# Patient Record
Sex: FEMALE | Race: OTHER | URBAN - METROPOLITAN AREA
[De-identification: names, ages, dates, MRNs, and addresses within clinical notes are randomized per-mention and may not be internally consistent; named-entity substitution may affect disease eponyms.]

---

## 2024-03-04 ENCOUNTER — APPOINTMENT (RX ONLY)
Dept: URBAN - METROPOLITAN AREA CLINIC 41 | Facility: CLINIC | Age: 74
Setting detail: DERMATOLOGY
End: 2024-03-04

## 2024-03-04 DIAGNOSIS — Z41.9 ENCOUNTER FOR PROCEDURE FOR PURPOSES OTHER THAN REMEDYING HEALTH STATE, UNSPECIFIED: ICD-10-CM

## 2024-03-04 PROCEDURE — ? FILLERS

## 2024-03-04 NOTE — PROCEDURE: FILLERS
Additional Area 4 Volume In Cc: 0
Include Cannula Information In Note?: No
Aspiration Statement: Aspiration was performed prior to injecting site with filler.
Consent: Written consent obtained. Risks include but not limited to bruising, beading, irregular texture, ulceration, infection, allergic reaction, scar formation, incomplete augmentation, temporary nature, procedural pain.
Lot #: [id number]
Include Cannula Brand?: DermaSculpt
Post-Care Instructions: Patient instructed to apply ice to reduce swelling.
Lot #: [id number]
Expiration Date (Month Year): 2024-06-14
Additional Area 1 Location: see drawing
Topical Anesthesia?: 23% lidocaine, 7% tetracaine
Include Cannula Size?: 25G
Additional Area 1 Volume In Cc: 1
Expiration Date (Month Year): 2019/08/05
Detail Level: Simple
Include Cannula Length?: 2 inch
Filler: Juvederm Ultra XC
Use Map Statement For Sites (Optional): Yes
Map Statment: See Attach Map for Complete Details
Additional Area 1 Location: see diagram
Anesthesia Type: 0.2% lidocaine (mixed within filler)
Lot #: [id number]
Expiration Date (Month Year): 2024-08-11

## 2024-12-05 ENCOUNTER — APPOINTMENT (OUTPATIENT)
Age: 74
Setting detail: DERMATOLOGY
End: 2024-12-05

## 2024-12-05 DIAGNOSIS — Z41.9 ENCOUNTER FOR PROCEDURE FOR PURPOSES OTHER THAN REMEDYING HEALTH STATE, UNSPECIFIED: ICD-10-CM

## 2024-12-05 PROCEDURE — ? FILLERS

## 2024-12-05 NOTE — PROCEDURE: FILLERS
Lateral Face Filler Volume In Cc: 0
Include Cannula Information In Note?: No
Include Documentation That Aspiration Was Performed Prior To Injecting Filler:: Yes
Expiration Date (Month Year): 01/2026
Include Cannula Size?: 25G
Anesthesia Type: 0.2% lidocaine (mixed within filler)
Filler: Juvederm Volbella XC
Map Statment: See Attach Map for Complete Details
Include Cannula Brand?: DermaSculpt
Detail Level: Zone
Additional Area 1 Location: see drawing
Additional Area 1 Volume In Cc: 1
Expiration Date (Month Year): 2025-08-07
Topical Anesthesia?: 30% lidocaine
Lot #: [id number]
Expiration Date (Month Year): 2019/08/05
Lot #: [id number]
Aspiration Statement: Aspiration was performed prior to injecting site with filler.
Additional Area 1 Location: see diagram
Include Cannula Length?: 2 inch
Post-Care Instructions: Patient instructed to apply ice to reduce swelling.
Lot #: [id number]
Consent: Written consent obtained. Risks include but not limited to bruising, beading, irregular texture, ulceration, infection, allergic reaction, scar formation, incomplete augmentation, temporary nature, procedural pain.

## 2025-01-16 ENCOUNTER — APPOINTMENT (OUTPATIENT)
Age: 75
Setting detail: DERMATOLOGY
End: 2025-01-16

## 2025-01-16 DIAGNOSIS — L57.0 ACTINIC KERATOSIS: ICD-10-CM

## 2025-01-16 DIAGNOSIS — L72.0 EPIDERMAL CYST: ICD-10-CM

## 2025-01-16 DIAGNOSIS — Z71.89 OTHER SPECIFIED COUNSELING: ICD-10-CM

## 2025-01-16 DIAGNOSIS — L57.8 OTHER SKIN CHANGES DUE TO CHRONIC EXPOSURE TO NONIONIZING RADIATION: ICD-10-CM

## 2025-01-16 DIAGNOSIS — Z85.828 PERSONAL HISTORY OF OTHER MALIGNANT NEOPLASM OF SKIN: ICD-10-CM

## 2025-01-16 PROCEDURE — ? TREATMENT REGIMEN

## 2025-01-16 PROCEDURE — 17003 DESTRUCT PREMALG LES 2-14: CPT

## 2025-01-16 PROCEDURE — ? LIQUID NITROGEN

## 2025-01-16 PROCEDURE — 17000 DESTRUCT PREMALG LESION: CPT

## 2025-01-16 PROCEDURE — ? PRESCRIPTION

## 2025-01-16 PROCEDURE — ? SUNSCREEN RECOMMENDATIONS

## 2025-01-16 PROCEDURE — 99213 OFFICE O/P EST LOW 20 MIN: CPT | Mod: 25

## 2025-01-16 PROCEDURE — ? COUNSELING

## 2025-01-16 RX ORDER — TRETIONIN 0.25 MG/G
1 CREAM TOPICAL QHS
Qty: 45 | Refills: 3 | Status: ERX | COMMUNITY
Start: 2025-01-16

## 2025-01-16 RX ADMIN — TRETIONIN 1: 0.25 CREAM TOPICAL at 00:00

## 2025-01-16 ASSESSMENT — LOCATION DETAILED DESCRIPTION DERM
LOCATION DETAILED: UPPER STERNUM
LOCATION DETAILED: LEFT UPPER CUTANEOUS LIP
LOCATION DETAILED: PHILTRUM
LOCATION DETAILED: RIGHT UPPER CUTANEOUS LIP
LOCATION DETAILED: RIGHT SUPERIOR MEDIAL MALAR CHEEK
LOCATION DETAILED: LEFT MEDIAL CANTHUS
LOCATION DETAILED: LEFT LATERAL MALAR CHEEK

## 2025-01-16 ASSESSMENT — LOCATION ZONE DERM
LOCATION ZONE: FACE
LOCATION ZONE: LIP
LOCATION ZONE: TRUNK
LOCATION ZONE: EYELID

## 2025-01-16 ASSESSMENT — LOCATION SIMPLE DESCRIPTION DERM
LOCATION SIMPLE: RIGHT CHEEK
LOCATION SIMPLE: CHEST
LOCATION SIMPLE: LEFT EYELID
LOCATION SIMPLE: RIGHT LIP
LOCATION SIMPLE: LEFT CHEEK
LOCATION SIMPLE: LEFT LIP
LOCATION SIMPLE: UPPER LIP

## 2025-01-16 NOTE — PROCEDURE: LIQUID NITROGEN
Application Tool (Optional): Liquid Nitrogen Sprayer
Show Aperture Variable?: Yes
Consent: Verbal consent was obtained and risks were reviewed including, but not limited to, scarring, infection, bleeding, scabbing, and incomplete removal. Discussed wound care instruction
Render Post-Care Instructions In Note?: no
Detail Level: Simple
Duration Of Freeze Thaw-Cycle (Seconds): 3
Number Of Freeze-Thaw Cycles: 2 freeze-thaw cycles
Post-Care Instructions: Apply Vaseline frequently.

## 2025-01-16 NOTE — PROCEDURE: TREATMENT REGIMEN
Initiate Treatment: Tretinoin QHS as tolerated
Detail Level: Simple
Otc Regimen: Recommended Diva Stuff Milia spot treatment

## 2025-01-16 NOTE — PROCEDURE: COUNSELING
Detail Level: Detailed
Sunscreen Recommendations: Discussed importance of regular photo protection with zinc-based sunscreen and photo protective clothing.
Detail Level: Zone
Sunscreen Recommendations: Discussed importance of regular photo protection with sun screen and photo protective clothing
Sunscreen Recommendations: Discussed importance of photo protection with mineral based sunscreen and photo protective clothing..
Topical Retinoids Recommendations: Recommended seeing an  for treatment

## 2025-01-17 RX ORDER — TRETIONIN 0.25 MG/G
1 CREAM TOPICAL QHS
Qty: 45 | Refills: 3 | Status: ERX